# Patient Record
Sex: FEMALE | Race: AMERICAN INDIAN OR ALASKA NATIVE | Employment: OTHER | URBAN - METROPOLITAN AREA
[De-identification: names, ages, dates, MRNs, and addresses within clinical notes are randomized per-mention and may not be internally consistent; named-entity substitution may affect disease eponyms.]

---

## 2018-12-03 ENCOUNTER — HOSPITAL ENCOUNTER (EMERGENCY)
Age: 18
Discharge: ANOTHER HOSPITAL | End: 2018-12-04
Attending: EMERGENCY MEDICINE
Payer: SELF-PAY

## 2018-12-03 DIAGNOSIS — T43.012A: Primary | ICD-10-CM

## 2018-12-03 DIAGNOSIS — N39.0 ACUTE UTI: ICD-10-CM

## 2018-12-03 DIAGNOSIS — F14.10 COCAINE ABUSE (HCC): ICD-10-CM

## 2018-12-03 DIAGNOSIS — F12.10 MARIJUANA ABUSE: ICD-10-CM

## 2018-12-03 LAB
ALBUMIN SERPL-MCNC: 3.7 G/DL (ref 3.4–5)
ALBUMIN/GLOB SERPL: 1.1 {RATIO} (ref 0.8–1.7)
ALP SERPL-CCNC: 92 U/L (ref 45–117)
ALT SERPL-CCNC: 20 U/L (ref 13–56)
AMPHET UR QL SCN: NEGATIVE
ANION GAP SERPL CALC-SCNC: 5 MMOL/L (ref 3–18)
APAP SERPL-MCNC: <2 UG/ML (ref 10–30)
APPEARANCE UR: ABNORMAL
AST SERPL-CCNC: 11 U/L (ref 15–37)
BACTERIA URNS QL MICRO: ABNORMAL /HPF
BARBITURATES UR QL SCN: NEGATIVE
BASOPHILS # BLD: 0 K/UL (ref 0–0.1)
BASOPHILS NFR BLD: 0 % (ref 0–2)
BENZODIAZ UR QL: NEGATIVE
BILIRUB SERPL-MCNC: 0.3 MG/DL (ref 0.2–1)
BILIRUB UR QL: NEGATIVE
BUN SERPL-MCNC: 8 MG/DL (ref 7–18)
BUN/CREAT SERPL: 9 (ref 12–20)
CALCIUM SERPL-MCNC: 9 MG/DL (ref 8.5–10.1)
CANNABINOIDS UR QL SCN: POSITIVE
CHLORIDE SERPL-SCNC: 110 MMOL/L (ref 100–108)
CO2 SERPL-SCNC: 26 MMOL/L (ref 21–32)
COCAINE UR QL SCN: POSITIVE
COLOR UR: YELLOW
CREAT SERPL-MCNC: 0.88 MG/DL (ref 0.6–1.3)
DIFFERENTIAL METHOD BLD: NORMAL
EOSINOPHIL # BLD: 0.1 K/UL (ref 0–0.4)
EOSINOPHIL NFR BLD: 2 % (ref 0–5)
EPITH CASTS URNS QL MICRO: ABNORMAL /LPF (ref 0–5)
ERYTHROCYTE [DISTWIDTH] IN BLOOD BY AUTOMATED COUNT: 12.3 % (ref 11.6–14.5)
ETHANOL SERPL-MCNC: <3 MG/DL (ref 0–3)
GLOBULIN SER CALC-MCNC: 3.3 G/DL (ref 2–4)
GLUCOSE SERPL-MCNC: 96 MG/DL (ref 74–99)
GLUCOSE UR STRIP.AUTO-MCNC: NEGATIVE MG/DL
HCG SERPL QL: NEGATIVE
HCT VFR BLD AUTO: 40.7 % (ref 35–45)
HDSCOM,HDSCOM: ABNORMAL
HGB BLD-MCNC: 14.1 G/DL (ref 12–16)
HGB UR QL STRIP: NEGATIVE
KETONES UR QL STRIP.AUTO: NEGATIVE MG/DL
LEUKOCYTE ESTERASE UR QL STRIP.AUTO: ABNORMAL
LYMPHOCYTES # BLD: 2.3 K/UL (ref 0.9–3.6)
LYMPHOCYTES NFR BLD: 34 % (ref 21–52)
MAGNESIUM SERPL-MCNC: 2 MG/DL (ref 1.6–2.6)
MCH RBC QN AUTO: 31.6 PG (ref 24–34)
MCHC RBC AUTO-ENTMCNC: 34.6 G/DL (ref 31–37)
MCV RBC AUTO: 91.3 FL (ref 74–97)
METHADONE UR QL: NEGATIVE
MONOCYTES # BLD: 0.5 K/UL (ref 0.05–1.2)
MONOCYTES NFR BLD: 7 % (ref 3–10)
NEUTS SEG # BLD: 3.9 K/UL (ref 1.8–8)
NEUTS SEG NFR BLD: 57 % (ref 40–73)
NITRITE UR QL STRIP.AUTO: NEGATIVE
OPIATES UR QL: NEGATIVE
PCP UR QL: NEGATIVE
PH UR STRIP: 6 [PH] (ref 5–8)
PLATELET # BLD AUTO: 276 K/UL (ref 135–420)
PMV BLD AUTO: 10 FL (ref 9.2–11.8)
POTASSIUM SERPL-SCNC: 4 MMOL/L (ref 3.5–5.5)
PROT SERPL-MCNC: 7 G/DL (ref 6.4–8.2)
PROT UR STRIP-MCNC: NEGATIVE MG/DL
RBC # BLD AUTO: 4.46 M/UL (ref 4.2–5.3)
RBC #/AREA URNS HPF: 0 /HPF (ref 0–5)
SALICYLATES SERPL-MCNC: <2.8 MG/DL (ref 2.8–20)
SODIUM SERPL-SCNC: 141 MMOL/L (ref 136–145)
SP GR UR REFRACTOMETRY: 1.01 (ref 1–1.03)
UROBILINOGEN UR QL STRIP.AUTO: 0.2 EU/DL (ref 0.2–1)
WBC # BLD AUTO: 6.8 K/UL (ref 4.6–13.2)
WBC URNS QL MICRO: ABNORMAL /HPF (ref 0–4)

## 2018-12-03 PROCEDURE — 74011000250 HC RX REV CODE- 250: Performed by: EMERGENCY MEDICINE

## 2018-12-03 PROCEDURE — 80307 DRUG TEST PRSMV CHEM ANLYZR: CPT

## 2018-12-03 PROCEDURE — 83735 ASSAY OF MAGNESIUM: CPT

## 2018-12-03 PROCEDURE — 99285 EMERGENCY DEPT VISIT HI MDM: CPT

## 2018-12-03 PROCEDURE — 93005 ELECTROCARDIOGRAM TRACING: CPT

## 2018-12-03 PROCEDURE — 85025 COMPLETE CBC W/AUTO DIFF WBC: CPT

## 2018-12-03 PROCEDURE — 84703 CHORIONIC GONADOTROPIN ASSAY: CPT

## 2018-12-03 PROCEDURE — 80053 COMPREHEN METABOLIC PANEL: CPT

## 2018-12-03 PROCEDURE — 81001 URINALYSIS AUTO W/SCOPE: CPT

## 2018-12-03 RX ORDER — CEPHALEXIN 250 MG/1
500 CAPSULE ORAL 3 TIMES DAILY
Status: DISCONTINUED | OUTPATIENT
Start: 2018-12-03 | End: 2018-12-04 | Stop reason: HOSPADM

## 2018-12-03 RX ADMIN — POISON ADSORBENT 50 G: 50 SUSPENSION ORAL at 20:15

## 2018-12-04 ENCOUNTER — HOSPITAL ENCOUNTER (INPATIENT)
Age: 18
LOS: 2 days | Discharge: HOME | DRG: 885 | End: 2018-12-06
Attending: PSYCHIATRY & NEUROLOGY | Admitting: PSYCHIATRY & NEUROLOGY
Payer: SELF-PAY

## 2018-12-04 VITALS
HEIGHT: 67 IN | RESPIRATION RATE: 14 BRPM | WEIGHT: 175 LBS | OXYGEN SATURATION: 99 % | DIASTOLIC BLOOD PRESSURE: 60 MMHG | HEART RATE: 59 BPM | BODY MASS INDEX: 27.47 KG/M2 | TEMPERATURE: 98.4 F | SYSTOLIC BLOOD PRESSURE: 99 MMHG

## 2018-12-04 PROBLEM — F31.30 BIPOLAR AFFECTIVE DISORDER, CURRENT EPISODE DEPRESSED (HCC): Status: ACTIVE | Noted: 2018-12-04

## 2018-12-04 PROBLEM — F31.9 BIPOLAR DISORDER (HCC): Status: RESOLVED | Noted: 2018-12-04 | Resolved: 2018-12-04

## 2018-12-04 PROBLEM — F31.9 BIPOLAR DISORDER (HCC): Status: ACTIVE | Noted: 2018-12-04

## 2018-12-04 PROBLEM — F31.11 BIPOLAR DISORD, CRNT EPISODE MANIC W/O PSYCH FEATURES, MILD (HCC): Status: ACTIVE | Noted: 2018-12-04

## 2018-12-04 LAB
ATRIAL RATE: 62 BPM
ATRIAL RATE: 63 BPM
ATRIAL RATE: 71 BPM
ATRIAL RATE: 89 BPM
CALCULATED P AXIS, ECG09: 28 DEGREES
CALCULATED P AXIS, ECG09: 33 DEGREES
CALCULATED P AXIS, ECG09: 40 DEGREES
CALCULATED P AXIS, ECG09: 45 DEGREES
CALCULATED R AXIS, ECG10: 22 DEGREES
CALCULATED R AXIS, ECG10: 22 DEGREES
CALCULATED R AXIS, ECG10: 24 DEGREES
CALCULATED R AXIS, ECG10: 25 DEGREES
CALCULATED T AXIS, ECG11: 10 DEGREES
CALCULATED T AXIS, ECG11: 19 DEGREES
CALCULATED T AXIS, ECG11: 20 DEGREES
CALCULATED T AXIS, ECG11: 9 DEGREES
DIAGNOSIS, 93000: NORMAL
P-R INTERVAL, ECG05: 156 MS
P-R INTERVAL, ECG05: 160 MS
P-R INTERVAL, ECG05: 164 MS
P-R INTERVAL, ECG05: 166 MS
Q-T INTERVAL, ECG07: 356 MS
Q-T INTERVAL, ECG07: 376 MS
Q-T INTERVAL, ECG07: 380 MS
Q-T INTERVAL, ECG07: 386 MS
QRS DURATION, ECG06: 68 MS
QRS DURATION, ECG06: 78 MS
QTC CALCULATION (BEZET), ECG08: 381 MS
QTC CALCULATION (BEZET), ECG08: 395 MS
QTC CALCULATION (BEZET), ECG08: 412 MS
QTC CALCULATION (BEZET), ECG08: 433 MS
VENTRICULAR RATE, ECG03: 62 BPM
VENTRICULAR RATE, ECG03: 63 BPM
VENTRICULAR RATE, ECG03: 71 BPM
VENTRICULAR RATE, ECG03: 89 BPM

## 2018-12-04 PROCEDURE — 74011250637 HC RX REV CODE- 250/637: Performed by: EMERGENCY MEDICINE

## 2018-12-04 PROCEDURE — 65220000003 HC RM SEMIPRIVATE PSYCH

## 2018-12-04 PROCEDURE — 74011250637 HC RX REV CODE- 250/637: Performed by: PSYCHIATRY & NEUROLOGY

## 2018-12-04 RX ORDER — HYDROXYZINE PAMOATE 50 MG/1
50 CAPSULE ORAL
Status: DISCONTINUED | OUTPATIENT
Start: 2018-12-04 | End: 2018-12-06 | Stop reason: HOSPADM

## 2018-12-04 RX ORDER — TRAZODONE HYDROCHLORIDE 50 MG/1
50 TABLET ORAL
Status: DISCONTINUED | OUTPATIENT
Start: 2018-12-04 | End: 2018-12-06 | Stop reason: HOSPADM

## 2018-12-04 RX ORDER — ARIPIPRAZOLE 5 MG/1
5 TABLET ORAL DAILY
Status: DISCONTINUED | OUTPATIENT
Start: 2018-12-04 | End: 2018-12-06 | Stop reason: HOSPADM

## 2018-12-04 RX ORDER — IBUPROFEN 400 MG/1
400 TABLET ORAL
Status: DISCONTINUED | OUTPATIENT
Start: 2018-12-04 | End: 2018-12-06 | Stop reason: HOSPADM

## 2018-12-04 RX ADMIN — ARIPIPRAZOLE 5 MG: 5 TABLET ORAL at 17:09

## 2018-12-04 RX ADMIN — CEPHALEXIN 500 MG: 250 CAPSULE ORAL at 00:09

## 2018-12-04 NOTE — ED NOTES
Pt stated \" I have ekaterina out my medications for awhile and today I had a manic episode, blacked out and took an overdose; when I came too , I knew I made a mistake\".

## 2018-12-04 NOTE — PROGRESS NOTES
Pt sitting in milieu eating. Admission  assessment completed. Pt denies any SI/HI at this time. Pt contracted for safety. Will continue to monitor.

## 2018-12-04 NOTE — ED NOTES
Patient transported via [address] to [facility] for admission to [facility] Unit. Patient's belongings, medical records, and EMTALA given to transport personnel.
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
## 2018-12-04 NOTE — ED PROVIDER NOTES
Otto Obregon is a 21 y.o. Female with h/o mental health d/o states she had mental breakdown tonight and took approx 15 10mg elavil about 7pm or 10 min prior to calling ems in order to hurt herself. States she has felt more depressed recently but no sig si. No recent prior attempts, admissions. Has not been on psych meds for a long time. No fcs, nvd, hallucinations. Not employed. Lives with fiancee. Denies drugs, alcohol The history is provided by the patient and the EMS personnel. Past Medical History:  
Diagnosis Date  Acute pharyngitis  ADD (attention deficit disorder)  Bipolar disorder ([facility] [address]  Daytime enuresis  Depression  Dysuria  Headache  Social anxiety disorder  Vaginitis and vulvovaginitis Past Surgical History:  
Procedure Laterality Date  HX WISDOM TEETH EXTRACTION Family History:  
Problem Relation Age of Onset  Bipolar Disorder Mother  Substance Abuse Mother  Substance Abuse Father Social History Socioeconomic History  Marital status: SINGLE Spouse name: Not on file  Number of children: Not on file  Years of education: Not on file  Highest education level: Not on file Social Needs  Financial resource strain: Not on file  Food insecurity - worry: Not on file  Food insecurity - inability: Not on file  Transportation needs - medical: Not on file  Transportation needs - non-medical: Not on file Occupational History  Not on file Tobacco Use  Smoking status: Never Smoker  Smokeless tobacco: Never Used Substance and Sexual Activity  Alcohol use: Yes Comment: Socially  Drug use: Yes Types: Marijuana Comment: last used ~1 month ago  Sexual activity: Yes  
  Partners: Male Birth control/protection: Pill Other Topics Concern  Not on file Social History Narrative  Not on file ALLERGIES: Patient has no known allergies. Review of Systems Constitutional: Negative for fever. HENT: Negative for sore throat. Respiratory: Negative for shortness of breath. Cardiovascular: Negative for chest pain. Gastrointestinal: Negative for abdominal pain. Endocrine: Negative for polyuria. Genitourinary: Negative for difficulty urinating and dysuria. Musculoskeletal: Negative for gait problem. Skin: Negative for rash. Allergic/Immunologic: Negative for immunocompromised state. Neurological: Negative for syncope, speech difficulty and light-headedness. Psychiatric/Behavioral: Positive for suicidal ideas. Negative for sleep disturbance. Vitals:  
 12/03/18 2100 12/03/18 2115 12/03/18 2315 12/03/18 2330 BP: 110/72 108/60 107/62 106/61 Pulse: 72 74 66 76 Resp: 16 11 16 16 Temp:      
SpO2: 99% 98% 99% 99% Weight:      
Height:      
      
 
Physical Exam  
Constitutional: She is oriented to person, place, and time. She appears well-developed and well-nourished. No distress. HENT:  
Head: Normocephalic and atraumatic. Right Ear: External ear normal.  
Left Ear: External ear normal.  
Nose: Nose normal.  
Mouth/Throat: Uvula is midline, oropharynx is clear and moist and mucous membranes are normal.  
Eyes: Conjunctivae are normal. No scleral icterus. Neck: Neck supple. Cardiovascular: Normal rate, regular rhythm, normal heart sounds and intact distal pulses. Pulmonary/Chest: Effort normal and breath sounds normal.  
Abdominal: Soft. There is no tenderness. Musculoskeletal: She exhibits no edema. Neurological: She is alert and oriented to person, place, and time. Gait normal.  
Skin: Skin is warm and dry. She is not diaphoretic. Psychiatric: She has a normal mood and affect. Her speech is normal and behavior is normal. Her affect is not blunt and not labile. She does not exhibit a depressed mood. She expresses suicidal ideation. She expresses suicidal plans. Nursing note and vitals reviewed. MDM 
  
 
Procedures Vitals: 
Patient Vitals for the past 12 hrs: 
 Temp Pulse Resp BP SpO2  
12/03/18 2330  76 16 106/61 99 % 12/03/18 2315  66 16 107/62 99 % 12/03/18 2115  74 11 108/60 98 % 12/03/18 2100  72 16 110/72 99 % 12/03/18 2045  81 24 105/55 99 % 12/03/18 2030  63 19 115/67 100 % 12/03/18 2015  84 17 125/77 100 % 12/03/18 2000  79 18 122/79 99 % 12/03/18 1940 98.4 °F (36.9 °C) 74 19 122/87 99 % Medications ordered:  
Medications  
cephALEXin (KEFLEX) capsule 500 mg (500 mg Oral Given 12/4/18 0009)  
activated charcoal (ACTIDOSE) oral suspension 50 g (50 g Oral Given 12/3/18 2015) Lab findings: 
Recent Results (from the past 12 hour(s)) CBC WITH AUTOMATED DIFF Collection Time: 12/03/18  7:50 PM  
Result Value Ref Range WBC 6.8 4.6 - 13.2 K/uL  
 RBC 4.46 4.20 - 5.30 M/uL  
 HGB 14.1 12.0 - 16.0 g/dL HCT 40.7 35.0 - 45.0 % MCV 91.3 74.0 - 97.0 FL  
 MCH 31.6 24.0 - 34.0 PG  
 MCHC 34.6 31.0 - 37.0 g/dL  
 RDW 12.3 11.6 - 14.5 % PLATELET 765 197 - 895 K/uL MPV 10.0 9.2 - 11.8 FL  
 NEUTROPHILS 57 40 - 73 % LYMPHOCYTES 34 21 - 52 % MONOCYTES 7 3 - 10 % EOSINOPHILS 2 0 - 5 % BASOPHILS 0 0 - 2 %  
 ABS. NEUTROPHILS 3.9 1.8 - 8.0 K/UL  
 ABS. LYMPHOCYTES 2.3 0.9 - 3.6 K/UL  
 ABS. MONOCYTES 0.5 0.05 - 1.2 K/UL  
 ABS. EOSINOPHILS 0.1 0.0 - 0.4 K/UL  
 ABS. BASOPHILS 0.0 0.0 - 0.1 K/UL  
 DF AUTOMATED METABOLIC PANEL, COMPREHENSIVE Collection Time: 12/03/18  7:50 PM  
Result Value Ref Range Sodium 141 136 - 145 mmol/L Potassium 4.0 3.5 - 5.5 mmol/L Chloride 110 (H) 100 - 108 mmol/L  
 CO2 26 21 - 32 mmol/L Anion gap 5 3.0 - 18 mmol/L Glucose 96 74 - 99 mg/dL BUN 8 7.0 - 18 MG/DL Creatinine 0.88 0.6 - 1.3 MG/DL  
 BUN/Creatinine ratio 9 (L) 12 - 20 GFR est AA >60 >60 ml/min/1.73m2 GFR est non-AA >60 >60 ml/min/1.73m2 Calcium 9.0 8.5 - 10.1 MG/DL  Bilirubin, total 0.3 0.2 - 1.0 MG/DL  
 ALT (SGPT) 20 13 - 56 U/L  
 AST (SGOT) 11 (L) 15 - 37 U/L Alk. phosphatase 92 45 - 117 U/L Protein, total 7.0 6.4 - 8.2 g/dL Albumin 3.7 3.4 - 5.0 g/dL Globulin 3.3 2.0 - 4.0 g/dL A-G Ratio 1.1 0.8 - 1.7 ETHYL ALCOHOL Collection Time: 12/03/18  7:50 PM  
Result Value Ref Range ALCOHOL(ETHYL),SERUM <3 0 - 3 MG/DL  
HCG QL SERUM Collection Time: 12/03/18  7:50 PM  
Result Value Ref Range HCG, Ql. NEGATIVE  NEG MAGNESIUM Collection Time: 12/03/18  7:50 PM  
Result Value Ref Range Magnesium 2.0 1.6 - 2.6 mg/dL SALICYLATE Collection Time: 12/03/18  7:50 PM  
Result Value Ref Range Salicylate level <1.5 (L) 2.8 - 20.0 MG/DL  
ACETAMINOPHEN Collection Time: 12/03/18  7:50 PM  
Result Value Ref Range Acetaminophen level <2 (L) 10.0 - 30.0 ug/mL EKG, 12 LEAD, INITIAL Collection Time: 12/03/18  7:54 PM  
Result Value Ref Range Ventricular Rate 89 BPM  
 Atrial Rate 89 BPM  
 P-R Interval 156 ms QRS Duration 68 ms Q-T Interval 356 ms  
 QTC Calculation (Bezet) 433 ms Calculated P Axis 45 degrees Calculated R Axis 25 degrees Calculated T Axis 19 degrees Diagnosis Sinus rhythm with marked sinus arrhythmia Otherwise normal ECG No previous ECGs available URINALYSIS W/ RFLX MICROSCOPIC Collection Time: 12/03/18  8:36 PM  
Result Value Ref Range Color YELLOW Appearance CLOUDY Specific gravity 1.015 1.005 - 1.030    
 pH (UA) 6.0 5.0 - 8.0 Protein NEGATIVE  NEG mg/dL Glucose NEGATIVE  NEG mg/dL Ketone NEGATIVE  NEG mg/dL Bilirubin NEGATIVE  NEG Blood NEGATIVE  NEG Urobilinogen 0.2 0.2 - 1.0 EU/dL Nitrites NEGATIVE  NEG Leukocyte Esterase MODERATE (A) NEG    
DRUG SCREEN, URINE Collection Time: 12/03/18  8:36 PM  
Result Value Ref Range  BENZODIAZEPINES NEGATIVE  NEG    
 BARBITURATES NEGATIVE  NEG    
 THC (TH-CANNABINOL) POSITIVE (A) NEG    
 OPIATES NEGATIVE  NEG    
 PCP(PHENCYCLIDINE) NEGATIVE  NEG    
 COCAINE POSITIVE (A) NEG    
 AMPHETAMINES NEGATIVE  NEG METHADONE NEGATIVE  NEG HDSCOM (NOTE) URINE MICROSCOPIC ONLY Collection Time: 12/03/18  8:36 PM  
Result Value Ref Range WBC 8 to 12 0 - 4 /hpf  
 RBC 0 0 - 5 /hpf Epithelial cells 1+ 0 - 5 /lpf Bacteria 2+ (A) NEG /hpf  
EKG, 12 LEAD, SUBSEQUENT Collection Time: 12/03/18  9:03 PM  
Result Value Ref Range Ventricular Rate 63 BPM  
 Atrial Rate 63 BPM  
 P-R Interval 160 ms QRS Duration 78 ms Q-T Interval 386 ms QTC Calculation (Bezet) 395 ms Calculated P Axis 40 degrees Calculated R Axis 24 degrees Calculated T Axis 10 degrees Diagnosis Normal sinus rhythm Normal ECG When compared with ECG of 03-DEC-2018 19:54, No significant change was found ACETAMINOPHEN Collection Time: 12/03/18  9:20 PM  
Result Value Ref Range Acetaminophen level <2 (L) 10.0 - 30.0 ug/mL EKG, 12 LEAD, SUBSEQUENT Collection Time: 12/03/18 10:03 PM  
Result Value Ref Range Ventricular Rate 67 BPM  
 Atrial Rate 67 BPM  
 P-R Interval 164 ms QRS Duration 84 ms Q-T Interval 362 ms QTC Calculation (Bezet) 382 ms Calculated P Axis 34 degrees Calculated R Axis 35 degrees Calculated T Axis 39 degrees Diagnosis Normal sinus rhythm with sinus arrhythmia Normal ECG When compared with ECG of 03-DEC-2018 21:03, No significant change was found ACETAMINOPHEN Collection Time: 12/03/18 11:18 PM  
Result Value Ref Range Acetaminophen level <2 (L) 10.0 - 30.0 ug/mL EKG, 12 LEAD, SUBSEQUENT Collection Time: 12/03/18 11:37 PM  
Result Value Ref Range Ventricular Rate 71 BPM  
 Atrial Rate 71 BPM  
 P-R Interval 166 ms  
 QRS Duration 78 ms Q-T Interval 380 ms QTC Calculation (Bezet) 412 ms Calculated P Axis 33 degrees Calculated R Axis 22 degrees Calculated T Axis 9 degrees Diagnosis Normal sinus rhythm with sinus arrhythmia Normal ECG When compared with ECG of 03-DEC-2018 22:07, No significant change was found EKG interpretation by ED Physician: 
nsr with sinus arrhythmia with no acute st tw changes Rate 89, pr 156, qtc 433 No previous Repeat 2103: nsr with no acute st tw changes Rate 63, pr 160, qrs 78, qtc 395 Not sig changed Repeat 2207: nsr with no acute st tw changes Rate 62, pr 164, qrs 78, qtc 381 Repeat 2337: nsr with no acute st tw changes Rate 71`, pr 166, qrs 78, qtc 412 No sig changes Cardiac monitor: nl rate, reg rhythm, no ectopy Pulse ox: 100% ra X-Ray, CT or other radiology findings or impressions: No orders to display Progress notes, Consult notes or additional Procedure notes: D/w [facility] who rec q1h ecgs for 4 total, 4 hour apap level and agrees with charcoal. Will follow D/w pt regarding plan for continued observation and is agreeable 12:31 AM 
D/w Roseanne Yao from [facility] who states pt can be cleared at this time from medical standpoint No acute medical condition that would prevent transfer to [facility] ED Critical Care Note System at risk for life threatening failure: cardiac, pulm, renal, neuro Associated problems: overdose, cocaine toxicity, hyperchloremia Critical Care services provided: overdose treatment, mult bedside reassessments, d/w [facility], consult, documentation Excluded procedures (time not included in critical care): mult ecg interp Total Critical Care Time (in minutes) 48 Reevaluation of patient:  
stable Disposition: 
Diagnosis:  
1. Overdose of tricyclic antidepressants, intentional self-harm, initial encounter ([facility] [address] 2. Cocaine abuse ([facility] [address] 3. Marijuana abuse 4. Acute UTI Disposition: transfer [facility] Follow-up Information None Medication List  
  
ASK your doctor about these medications * ABILIFY 10 mg tablet Generic drug:  ARIPiprazole * ARIPiprazole 5 mg tablet Commonly known as:  ABILIFY 
  
busPIRone 10 mg tablet Commonly known as:  BUSPAR 
  
lamoTRIgine 25 mg tablet Commonly known as: LaMICtal 
  
MICROGESTIN FE 1/20 (28) 1 mg-20 mcg (21)/75 mg (7) Tab Generic drug:  norethindrone-ethinyl estradiol VYVANSE 50 mg Cap Generic drug:  Lisdexamfetamine * This list has 2 medication(s) that are the same as other medications prescribed for you. Read the directions carefully, and ask your doctor or other care provider to review them with you.

## 2018-12-04 NOTE — BSMART NOTE
ART THERAPY GROUP PROGRESS NOTE PATIENT SCHEDULED FOR GROUP AT: 14:00 
 
ATTENDANCE: 1/4 PARTICIPATION LEVEL: Participates fully in the art process ATTENTION LEVEL: Able to focus on task FOCUS: Identify emotions SYMBOLIC & THEMATIC CONTENT AS NOTED IN IMAGERY: She was called out for about 3/4 of group to meet with her doctor. She was calm ,compliant, and invested in the task at hand. She expressed interest in creative expression and identified with a fictional character from a tv show, who was described as [place] girl who disguises herself as a boy to search for the truth and be accepted. \" She claimed that she identifies as \"gender Eugenio Up" and gets frustrated and tired of constantly trying to explain it to her parents, whom she described as \"older generation that don't really get it. \" However, she did acknowledge that her parents are supportive of her decisions, they just \"frequently ask questions about it. \"

## 2018-12-04 NOTE — BH NOTES
GROUP THERAPY PROGRESS NOTE Petros Art was encouraged by staff but refused to participate in  [place].
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
## 2018-12-04 NOTE — ED TRIAGE NOTES
Pt arrived via EMS complaining of intentional over dose of amitriptyline 10mg x 15 pill approx 35 mins prior to arrival in ER.

## 2018-12-04 NOTE — BH NOTES
GROUP THERAPY PROGRESS NOTE Judythe Osler was encouraged by staff but refused to participate in  [place].

## 2018-12-04 NOTE — BH NOTES
Damari Goode is  participating in Treatment Concerns Group time: 4036 Personal goal for participation: [employer] Goal orientation: community Group therapy participation: fully participated Therapeutic interventions reviewed and discussed: Staff discussed  Staff discussed the Mental Health programs offered. Unit schedule for groups,  Visiting hours, patient advocate name and phone number and where this information is posted on the unit, etc. Report any maintenance/housekeeping or treatment concerns to staff so it can be addressed by the Treatment Team. 
 
Impression of participation: Pt.did not have any maintenance/housekeeping or treatment concerns to report to staff .

## 2018-12-04 NOTE — BSMART NOTE
Pt. Is a  19year old female with history of Bipolar Disorder, Marijuana  Abuse, Cocaine Abuse And Borderline Personality disorder. Pt. Was admitted to this facility as a transfer following a suicidal overdose . Pt.'s case was discussed in treatment team this a.m. 
 
AL[facility] Contact:  SW met with pt  to discuss this admission. Pt. Admits she has been dealing with overwhelming stressors. Pt. Stated she recently moved out on her own with a partner. Pt. Stated the move was overwhelming. Pt states this  is the first time she has lived on her own. Pt. Admits she started stressing and impulsively overdosed. Pt.  Expressed being regretful over the overdose. SW discussed positive coping skills, safety plan and aftercare. Pt. Stated she was in counseling at one time. Pt. Explained she has not been able to continue with therapy since loosing her insurance a year ago. Pt. Recently was approved for GAP. Pt. Plans to start receiving oupt mental health counseling at the [facility] in [place] [address]. The pt. Will receive a . Pt. Stated outside of receiving supportive services from the center, friends and some family are supportive. Pt. Stated the reason she moved out on her own is because her family does not understand nor support her lifestyle as LGBT. Pt stated she has been living with her grandfather and 27 year aunt since she was little. The pt. Stated her grandparents raised her. SW provided positive words of encouragement,  Discussed self-efficacy and continued safety plan. Pt. Stated she will start to journal and write things down as a form of positive coping. Pt. Is cooperative, fair insight and judgment.

## 2018-12-04 NOTE — BH NOTES
Chelita Orr is not participating in Recreational Therapy. Group time: 1600 Personal goal for participation: fresh air break Goal orientation: social 
 
Group therapy participation: refuse Therapeutic interventions reviewed and discussed: Staff encouraged Pt. To participate in group Impression of participation: Pt refuse, chose to rest in bed despite staff encouragement

## 2018-12-04 NOTE — ED NOTES
Patient awake and alert at this time, asking for something to eat. Patient asks this nurse if her fiance can bring her food, physician denies request as there can be no control over what the patient is consuming. This nurse explains the concern to the patient, who expresses understanding. Patient provided with nourishment from ED patient freezer meals and gingerale.

## 2018-12-05 PROCEDURE — 65220000003 HC RM SEMIPRIVATE PSYCH

## 2018-12-05 RX ORDER — ARIPIPRAZOLE 5 MG/1
5 TABLET ORAL DAILY
Qty: 30 TAB | Refills: 0 | Status: SHIPPED | OUTPATIENT
Start: 2018-12-06

## 2018-12-05 NOTE — PROGRESS NOTES
[address] Inpatient Progress Note Date of Service: 12/05/18 Hospital Day: 1 Subjective/Interval History 12/05/18 Treatment Team Notes:  Notes reviewed and/or discussed and report that Chelita Orr is doing better today. Her affect is brighter. She has been visible on the milieu attending groups and interacting with staff and peers. Patient interview: Chelita Orr was interviewed by this writer today. Pt reports she is feeling much better in terms of mood. Feels more level and that Abilify is making a difference. She has not had any adverse reaction to the medication. She denies SI or psychotic symptoms. She denies problems with sleep and appetite. Objective Visit Vitals /71 (BP 1 Location: Right arm, BP Patient Position: Sitting) Pulse 92 Temp 98.2 °F (36.8 °C) Resp 16 Ht 5' 7\" (1.702 m) Wt 79.4 kg (174 lb 16 oz) BMI 27.41 kg/m² No results found for this or any previous visit (from the past 24 hour(s)). Mental Status Examination Appearance/Hygiene 19 y.o. WHITE OR  female Hygiene: Good Behavior/Social Relatedness Appropriate Musculoskeletal Gait/Station: appropriate Tone (flaccid, cogwheeling, spastic): not assessed Psychomotor (hyperkinetic, hypokinetic): calm Involuntary movements (tics, dyskinesias, akathisa, stereotypies): none Speech   Rate, rhythm, volume, fluency and articulation are appropriate Mood   euthymic Affect    congruent Thought Process Linear and goal directed Thought Content and Perceptual Disturbances Denies self-injurious behavior (SIB), suicidal ideation (SI), aggressive behavior or homicidal ideation (HI) Denies auditory and visual hallucinations Sensorium and Cognition  Grossly intact Insight  fair Judgment fair Assessment/Plan Psychiatric Diagnoses:  
Patient Active Problem List  
Diagnosis Code  Urinary incontinence R32  Bipolar affective disorder, [facility] F31.30 Psychosocial and contextual factors: Family stressors Level of impairment/disability: None Yamini Coffey is a 19 y.o. who is currently admitted for suicide attempt. 1.  Continue current treatment plan. 2.  Reviewed instructions, risks, benefits and side effects of medications 3. Disposition/Discharge Date: self-care/home, tomorrow. James Coyle MD DR. [facility]

## 2018-12-05 NOTE — BSMART NOTE
OCCUPATIONAL THERAPY PROGRESS NOTE Group Time:  1500 Attendance: The patient attended full group. Participation: The patient participated fully in the activity. Attention: The patient was able to focus on the activity. Interaction: The patient occasionally  interacts with others. Focused and appropriate with some insight.

## 2018-12-05 NOTE — BSMART NOTE
ART THERAPY GROUP PROGRESS NOTE PATIENT SCHEDULED FOR GROUP AT: [id number] ATTENDANCE: Full PARTICIPATION LEVEL: Participates fully in the art process ATTENTION LEVEL: Able to focus on task FOCUS: goals SYMBOLIC & THEMATIC CONTENT AS NOTED IN IMAGERY: she was calm, compliant, and invested in the task at hand. She is insightful and participated in group discussion. She identified her tendency to \"dwell on negativity,\" and how she has chosen to start filtering out negative feeds on her social media.

## 2018-12-05 NOTE — BSMART NOTE
Pt. Is a  19year old female with history of Bipolar Disorder, Marijuana  Abuse, Cocaine Abuse And Borderline Personality disorder. Pt. Was admitted to this facility as a transfer following a suicidal overdose . Pt.'s case was discussed in treatment team this a.m. Pt. might be dc today or tomorrow. ALEC Contact:  SW met with pt  to discuss d/c planning. Pt. stated she did not sleep to well because of the bed. Pt. expressed emotionally she is feeling better. Pt is denying ideations and hallucinations. SW had pt to reflect on safety plan, positive coping activities and support. Pt. expressed her friends and family are very supportive. Pt. stated her friends came to visit her along with family yesterday. The pt. . Stated her visits  went well. The pt. stated her friends brought her a journal.  Pt. expressed being happy. Sw discussed safety plan. SW suggested for pt to come up with a safety plan. The pt. has identified people in her support/safety plan. Pt. will informed the support system about a code or identify a sign  to indicate when she is not well. Pt. stated she has a tendency to either shut down and isolate when she is not feeling well. Pt. will tell other s she is fine. , even though she may not be. Pt. is able to process emotions. Pt. could benefit form continued DBT. Pt. has good insight and judgement. Pt. is goal oriented and mood is improving. Pt. plans to return to her home address and follow up with [facility] for Counseling.

## 2018-12-05 NOTE — BH NOTES
GROUP THERAPY PROGRESS NOTE Christiane Schmidt is participating in Recreational Therapy. Group time: 30 minutes Personal goal for participation: Physical activity Goal orientation: social 
 
Group therapy participation: active Therapeutic interventions reviewed and discussed: We discussed how it is important to develop healthy stress management techniques. Impression of participation: Patient fully participated.

## 2018-12-05 NOTE — PROGRESS NOTES
conducted Spirituality Group for Francine, who is a 18 y.o.,female. Patients Primary Language is: [place]. According to the patients EMR Evangelical Affiliation is: No preference. The reason the Patient came to the hospital is:  
Patient Active Problem List  
 Diagnosis Date Noted  Bipolar affective disorder, current episode depressed ([facility] [address] 12/04/2018  Urinary incontinence 10/02/2015 The  provided the following Interventions: 
Continued the relationship of care and support. Listened empathically. Offered prayer and assurance of continued prayer on patients behalf. Chart reviewed. The following outcomes were achieved: 
Patient expressed gratitude for 's visit. Assessment: 
There are no further spiritual or Taoism issues which require Spiritual Care Services interventions at this time. Plan: 
Chaplains will continue to follow and will provide pastoral care on an as needed/requested basis.  recommends bedside caregivers page  on duty if patient shows signs of acute spiritual or emotional distress. [id number] [facility] Spiritual Care  
[phone]

## 2018-12-05 NOTE — BH NOTES
Pt received a visit from her father and boyfriend, they wished her a happy birthday. Pt was happy. Pt has been  calm and cooperative in the milieu interacting with staff and peers. Pt. denies SI,HI and AVH today. Pt ate 100% of dinner and snack. Pt. contracts for safety on the unit. Pt.denies any new medical/pain complaints. Staff encouraged Pt. to participate in treatment plan. Pt agreed. Pt. remain free of falls and provided non skid socks. Staff will continue to monitor Pt. for behavior safety and location.

## 2018-12-05 NOTE — PROGRESS NOTES
Problem: Suicide/Homicide (Adult/Pediatric) Goal: *STG: Seeks staff when feelings of self harm or harm towards others arise Pt will seek staff when feelings of self harm or harm towards others arise during this admission. Outcome: Progressing Towards Goal 
Pt will seek staff if feelings of self harm or harm towards others shall arrive daily during this admission. Goal: *STG: Attends activities and groups Pt will attend at least 3 out of 5 activities/groups during this admission. Outcome: Progressing Towards Goal 
Pt will attend at least 3 out of 5 group/activites daily during this admission. Goal: *STG/LTG: Complies with medication therapy Pt will comply with daily medication regimen during this admission. Outcome: Not Progressing Towards Goal 
Pt will be encouraged to comply with daily medication regimen during this admission. Comments: Pt in milieu for meals, pt refused her abilify due to nausea. Pt denies SI/HI but still appears to be depressed. Pt contracted for safety. Pt will remain free of falls and will continue to monitor for safety.

## 2018-12-06 VITALS
SYSTOLIC BLOOD PRESSURE: 126 MMHG | DIASTOLIC BLOOD PRESSURE: 74 MMHG | HEART RATE: 84 BPM | WEIGHT: 175 LBS | RESPIRATION RATE: 20 BRPM | HEIGHT: 67 IN | TEMPERATURE: 97.6 F | BODY MASS INDEX: 27.47 KG/M2

## 2018-12-06 PROCEDURE — 74011250637 HC RX REV CODE- 250/637: Performed by: PSYCHIATRY & NEUROLOGY

## 2018-12-06 RX ADMIN — ARIPIPRAZOLE 5 MG: 5 TABLET ORAL at 08:37

## 2018-12-06 NOTE — DISCHARGE INSTRUCTIONS
BEHAVIORAL HEALTH NURSING DISCHARGE NOTE      The following personal items collected during your admission are returned to you:   Dental Appliance: Dental Appliances: None  Vision: Visual Aid: None  Hearing Aid:    Jewelry: Jewelry: None  Clothing: Clothing: Footwear, Shirt, Pants  Other Valuables: Other Valuables: Other (comment)(Pillow,Drivers's permit)  Valuables sent to safe: Personal Items Sent to Safe: None      PATIENT INSTRUCTIONS:      The discharge information has been reviewed with the patient. The patient verbalized understanding.       Patient armband removed and shredded

## 2018-12-06 NOTE — BH NOTES
Pt  excited about discharged tomorrow. Pt participated in group this shift. Pt received a visit from father and boyfriend, positive interacting observed. appears calm and cooperative in the milieu interacting with staff and peers. Pt. denies SI,HI and AVH today. Pt ate 100% of dinner and snack. Pt contracts for safety on the unit. Pt.denies any new medical/pain complaints. Staff encouraged Pt. to participate in treatment plan. Pt agreed. Pt. remain free of falls and provided non skid socks. Staff will continue to monitor Pt. for behavior safety and location.

## 2018-12-06 NOTE — BH NOTES
Treatment team met - Medical Director: __x___present Psychiatrist: __x___present Charge nurse: _x____present MSW: _x____present : _x____present Nurse Manager: _x____present Student RNs: _____present Medical Students: _____present Art Therapist: _x____present Clinical Coordinator: __x___present Occupational Therapist: _x____present : _x______ present UR  ____x___ present Crisis Supervisor_______present Plan of care discussed and updated as appropriate.

## 2018-12-06 NOTE — BH NOTES
GROUP THERAPY PROGRESS NOTE Petros Art is not participating in [place]. Pt did not attend although offered choosing to sleep.

## 2018-12-06 NOTE — BSMART NOTE
Pt. Is a  21 year old female with history of Bipolar Disorder, Marijuana  Abuse, Cocaine Abuse And Borderline Personality disorder. Pt. Was admitted to this facility as a transfer following a suicidal overdose . 
  
Pt.'s case was discussed in treatment team this a.m. Pt. Will be d/c today. ALEC Contact:  SW met with pt  to discuss d/c plan. Pt. denies ideations and hallucinations. Pt. plans to return to her current residence. Pt. Will have a roommate staying with pt was informed of the following for her dc appointments: t. Is encouraged to go to [facility] board for same day access Monday- Thursday. 8:00 a.m -12:00 p.m   [facility]  Ph:  [phone] . Pt. Is encouraged to go to intake on 12/10/18. SW contacted [address] [place] [address] to obtain pt. 's upcoming appointment. Pt. Can follow-up with [facility] for counseling the first of the year. The agency stated they will be able to assist her with other services. Pt. Has an appointment under \"[facility]\" scheduled for 12/7/18.    [address]  Phone: [phone]  fax [phone] fax Pt.'s family will pick her up

## 2018-12-06 NOTE — ROUTINE PROCESS
Pt provided with discharge instructions, prescriptions, personal belongings, and f/u appointment provided. Pt denies any questions or concerns. Pt escorted of unit.
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
## 2019-07-15 ENCOUNTER — HOSPITAL ENCOUNTER (EMERGENCY)
Age: 19
Discharge: HOME | End: 2019-07-16
Attending: EMERGENCY MEDICINE | Admitting: EMERGENCY MEDICINE
Payer: MEDICAID

## 2019-07-15 VITALS
DIASTOLIC BLOOD PRESSURE: 83 MMHG | OXYGEN SATURATION: 98 % | TEMPERATURE: 97.8 F | SYSTOLIC BLOOD PRESSURE: 128 MMHG | BODY MASS INDEX: 29.4 KG/M2 | HEART RATE: 101 BPM | HEIGHT: 68 IN | RESPIRATION RATE: 16 BRPM | WEIGHT: 194 LBS

## 2019-07-15 DIAGNOSIS — F32.A DEPRESSION, UNSPECIFIED DEPRESSION TYPE: Primary | ICD-10-CM

## 2019-07-15 LAB
ALBUMIN SERPL-MCNC: 4.4 G/DL (ref 3.4–5)
ALBUMIN/GLOB SERPL: 1.3 {RATIO} (ref 0.8–1.7)
ALP SERPL-CCNC: 144 U/L (ref 45–117)
ALT SERPL-CCNC: 68 U/L (ref 13–56)
AMPHET UR QL SCN: NEGATIVE
ANION GAP SERPL CALC-SCNC: 8 MMOL/L (ref 3–18)
AST SERPL-CCNC: 29 U/L (ref 15–37)
BARBITURATES UR QL SCN: NEGATIVE
BASOPHILS # BLD: 0 K/UL (ref 0–0.1)
BASOPHILS NFR BLD: 1 % (ref 0–2)
BENZODIAZ UR QL: NEGATIVE
BILIRUB SERPL-MCNC: 0.5 MG/DL (ref 0.2–1)
BUN SERPL-MCNC: 12 MG/DL (ref 7–18)
BUN/CREAT SERPL: 13 (ref 12–20)
CALCIUM SERPL-MCNC: 9.3 MG/DL (ref 8.5–10.1)
CANNABINOIDS UR QL SCN: NEGATIVE
CHLORIDE SERPL-SCNC: 106 MMOL/L (ref 100–108)
CO2 SERPL-SCNC: 27 MMOL/L (ref 21–32)
COCAINE UR QL SCN: NEGATIVE
CREAT SERPL-MCNC: 0.94 MG/DL (ref 0.6–1.3)
DIFFERENTIAL METHOD BLD: NORMAL
EOSINOPHIL # BLD: 0 K/UL (ref 0–0.4)
EOSINOPHIL NFR BLD: 1 % (ref 0–5)
ERYTHROCYTE [DISTWIDTH] IN BLOOD BY AUTOMATED COUNT: 12.5 % (ref 11.6–14.5)
ETHANOL SERPL-MCNC: <3 MG/DL (ref 0–3)
GLOBULIN SER CALC-MCNC: 3.5 G/DL (ref 2–4)
GLUCOSE SERPL-MCNC: 91 MG/DL (ref 74–99)
HCG UR QL: NEGATIVE
HCT VFR BLD AUTO: 42.7 % (ref 35–45)
HDSCOM,HDSCOM: NORMAL
HGB BLD-MCNC: 14.5 G/DL (ref 12–16)
LYMPHOCYTES # BLD: 2.2 K/UL (ref 0.9–3.6)
LYMPHOCYTES NFR BLD: 26 % (ref 21–52)
MCH RBC QN AUTO: 31 PG (ref 24–34)
MCHC RBC AUTO-ENTMCNC: 34 G/DL (ref 31–37)
MCV RBC AUTO: 91.4 FL (ref 74–97)
METHADONE UR QL: NEGATIVE
MONOCYTES # BLD: 0.7 K/UL (ref 0.05–1.2)
MONOCYTES NFR BLD: 8 % (ref 3–10)
NEUTS SEG # BLD: 5.4 K/UL (ref 1.8–8)
NEUTS SEG NFR BLD: 64 % (ref 40–73)
OPIATES UR QL: NEGATIVE
PCP UR QL: NEGATIVE
PLATELET # BLD AUTO: 310 K/UL (ref 135–420)
PMV BLD AUTO: 10.1 FL (ref 9.2–11.8)
POTASSIUM SERPL-SCNC: 4.2 MMOL/L (ref 3.5–5.5)
PROT SERPL-MCNC: 7.9 G/DL (ref 6.4–8.2)
RBC # BLD AUTO: 4.67 M/UL (ref 4.2–5.3)
SODIUM SERPL-SCNC: 141 MMOL/L (ref 136–145)
WBC # BLD AUTO: 8.3 K/UL (ref 4.6–13.2)

## 2019-07-15 PROCEDURE — 80307 DRUG TEST PRSMV CHEM ANLYZR: CPT

## 2019-07-15 PROCEDURE — 80053 COMPREHEN METABOLIC PANEL: CPT

## 2019-07-15 PROCEDURE — 99285 EMERGENCY DEPT VISIT HI MDM: CPT

## 2019-07-15 PROCEDURE — 85025 COMPLETE CBC W/AUTO DIFF WBC: CPT

## 2019-07-15 PROCEDURE — 81025 URINE PREGNANCY TEST: CPT
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
## 2019-07-16 NOTE — ED NOTES
I have reviewed discharge instructions with the patient. The patient verbalized understanding. Patient armband removed and shredded. Pt d/c'd to home awake, alert and in NAD. All questions answered. Denies SI.   States she will follow up

## 2019-07-16 NOTE — DISCHARGE INSTRUCTIONS
Patient Education        Recovering From Depression: Care Instructions  Your Care Instructions    Taking good care of yourself is important as you recover from depression. In time, your symptoms will fade as your treatment takes hold. Do not give up. Instead, focus your energy on getting better. Your mood will improve. It just takes some time. Focus on things that can help you feel better, such as being with friends and family, eating well, and getting enough rest. But take things slowly. Do not do too much too soon. You will begin to feel better gradually. Follow-up care is a key part of your treatment and safety. Be sure to make and go to all appointments, and call your doctor if you are having problems. It's also a good idea to know your test results and keep a list of the medicines you take. How can you care for yourself at home? Be realistic  · If you have a large task to do, break it up into smaller steps you can handle, and just do what you can. · You may want to put off important decisions until your depression has lifted. If you have plans that will have a major impact on your life, such as marriage, divorce, or a job change, try to wait a bit. Talk it over with friends and loved ones who can help you look at the overall picture first.  · Reaching out to people for help is important. Do not isolate yourself. Let your family and friends help you. Find someone you can trust and confide in, and talk to that person. · Be patient, and be kind to yourself. Remember that depression is not your fault and is not something you can overcome with willpower alone. Treatment is necessary for depression, just like for any other illness. Feeling better takes time, and your mood will improve little by little. Stay active  · Stay busy and get outside. Take a walk, or try some other light exercise. · Talk with your doctor about an exercise program. Exercise can help with mild depression. · Go to a movie or concert. Take part in a [employer] activity or other social gathering. Go to a [employer] game. · Ask a friend to have dinner with you. Take care of yourself  · Eat a balanced diet with plenty of fresh fruits and vegetables, whole grains, and lean protein. If you have lost your appetite, eat small snacks rather than large meals. · Avoid drinking alcohol or using illegal drugs. Do not take medicines that have not been prescribed for you. They may interfere with medicines you may be taking for depression, or they may make your depression worse. · Take your medicines exactly as they are prescribed. You may start to feel better within 1 to 3 weeks of taking antidepressant medicine. But it can take as many as 6 to 8 weeks to see more improvement. If you have questions or concerns about your medicines, or if you do not notice any improvement by 3 weeks, talk to your doctor. · If you have any side effects from your medicine, tell your doctor. Antidepressants can make you feel tired, dizzy, or nervous. Some people have dry mouth, constipation, headaches, sexual problems, or diarrhea. Many of these side effects are mild and will go away on their own after you have been taking the medicine for a few weeks. Some may last longer. Talk to your doctor if side effects are bothering you too much. You might be able to try a different medicine. · Get enough sleep. If you have problems sleeping:  ? Go to bed at the same time every night, and get up at the same time every morning. ? Keep your bedroom dark and quiet. ? Do not exercise after 5:00 p.m.  ? Avoid drinks with caffeine after 5:00 p.m. · Avoid sleeping pills unless they are prescribed by the doctor treating your depression. Sleeping pills may make you groggy during the day, and they may interact with other medicine you are taking. · If you have any other illnesses, such as diabetes, heart disease, or high blood pressure, make sure to continue with your treatment.  Tell your doctor about all of the medicines you take, including those with or without a prescription. · Keep the numbers for these national suicide hotlines: 0-643-427-TALK (4-384.322.4956) and 4-250-HSBZIYS (1-644.690.1948). If you or someone you know talks about suicide or feeling hopeless, get help right away. When should you call for help? Call 911 anytime you think you may need emergency care. For example, call if:    · You feel like hurting yourself or someone else.     · Someone you know has depression and is about to attempt or is attempting suicide.   [facility] your doctor now or seek immediate medical care if:    · You hear voices.     · Someone you know has depression and:  ? Starts to give away his or her possessions. ? Uses illegal drugs or drinks alcohol heavily. ? Talks or writes about death, including writing suicide notes or talking about guns, knives, or pills. ? Starts to spend a lot of time alone. ? Acts very aggressively or suddenly appears calm.    Watch closely for changes in your health, and be sure to contact your doctor if:    · You do not get better as expected. Where can you learn more? Go to http://blu-wendi.info/. Enter A084 in the search box to learn more about \"Recovering From Depression: Care Instructions. \"  Current as of: September 11, 2018  Content Version: 11.9  © 8978-4236 [employer]. Care instructions adapted under license by [employer] (which disclaims liability or warranty for this information). If you have questions about a medical condition or this instruction, always ask your healthcare professional. [address] any warranty or liability for your use of this information.